# Patient Record
Sex: MALE | HISPANIC OR LATINO | URBAN - NONMETROPOLITAN AREA
[De-identification: names, ages, dates, MRNs, and addresses within clinical notes are randomized per-mention and may not be internally consistent; named-entity substitution may affect disease eponyms.]

---

## 2021-08-31 ENCOUNTER — OFFICE VISIT (OUTPATIENT)
Dept: FAMILY MEDICINE CLINIC | Facility: CLINIC | Age: 26
End: 2021-08-31

## 2021-08-31 VITALS
HEIGHT: 62 IN | OXYGEN SATURATION: 97 % | TEMPERATURE: 104.1 F | HEART RATE: 118 BPM | WEIGHT: 124 LBS | BODY MASS INDEX: 22.82 KG/M2 | RESPIRATION RATE: 20 BRPM | DIASTOLIC BLOOD PRESSURE: 58 MMHG | SYSTOLIC BLOOD PRESSURE: 110 MMHG

## 2021-08-31 DIAGNOSIS — Z20.822 COVID-19 RULED OUT BY LABORATORY TESTING: ICD-10-CM

## 2021-08-31 DIAGNOSIS — J06.9 UPPER RESPIRATORY TRACT INFECTION, UNSPECIFIED TYPE: ICD-10-CM

## 2021-08-31 DIAGNOSIS — Z20.822 COVID-19 RULED OUT BY LABORATORY TESTING: Primary | ICD-10-CM

## 2021-08-31 PROCEDURE — 99202 OFFICE O/P NEW SF 15 MIN: CPT | Performed by: NURSE PRACTITIONER

## 2021-08-31 PROCEDURE — 87636 SARSCOV2 & INF A&B AMP PRB: CPT | Performed by: NURSE PRACTITIONER

## 2021-08-31 PROCEDURE — 96372 THER/PROPH/DIAG INJ SC/IM: CPT | Performed by: NURSE PRACTITIONER

## 2021-08-31 RX ORDER — CEFTRIAXONE 500 MG/1
500 INJECTION, POWDER, FOR SOLUTION INTRAMUSCULAR; INTRAVENOUS ONCE
Status: COMPLETED | OUTPATIENT
Start: 2021-08-31 | End: 2021-08-31

## 2021-08-31 RX ORDER — DEXAMETHASONE SODIUM PHOSPHATE 4 MG/ML
4 INJECTION, SOLUTION INTRA-ARTICULAR; INTRALESIONAL; INTRAMUSCULAR; INTRAVENOUS; SOFT TISSUE ONCE
Status: COMPLETED | OUTPATIENT
Start: 2021-08-31 | End: 2021-08-31

## 2021-08-31 RX ADMIN — CEFTRIAXONE 500 MG: 500 INJECTION, POWDER, FOR SOLUTION INTRAMUSCULAR; INTRAVENOUS at 16:20

## 2021-08-31 RX ADMIN — DEXAMETHASONE SODIUM PHOSPHATE 4 MG: 4 INJECTION, SOLUTION INTRA-ARTICULAR; INTRALESIONAL; INTRAMUSCULAR; INTRAVENOUS; SOFT TISSUE at 16:21

## 2021-09-01 LAB
FLUAV RNA RESP QL NAA+PROBE: NOT DETECTED
FLUBV RNA RESP QL NAA+PROBE: NOT DETECTED
SARS-COV-2 RNA RESP QL NAA+PROBE: DETECTED

## 2021-09-01 NOTE — PATIENT INSTRUCTIONS
"Airborne Precautions  Airborne precautions are guidelines for the care of a person who has a disease that spreads through germs (particles) in the air. Examples of airborne diseases include measles, chickenpox, smallpox, tuberculosis, and possibly severe acute respiratory syndrome (SARS)-associated coronavirus. Following these guidelines helps keep the disease from spreading to others.  Guidelines for patients         If you have an airborne disease, follow these guidelines at the hospital or clinic:  · You will be treated in a private room with a special air supply.  ? Keep the door to your private room closed.  ? Wear a mask in your room as told by your nurse.  ? Check with your nurse before you leave your room.  · Wear a mask if you must go to another area of the hospital or clinic. Leave your room only when medically necessary. Make sure that the mask fits tightly.  · Cover your mouth with a tissue when you cough.  · Cover your mouth and nose with a tissue when you sneeze.  · If you have sores that can spread the infection (infectious lesions), keep the affected areas covered.  · Wash your hands often with soap and water for at least 20 seconds. This is an important way to prevent spread of the disease. If soap and water are not available, use an alcohol-based hand .  Guidelines for visitors  If you are visiting someone who has an airborne disease, follow these guidelines:  · Check with a nurse before you enter a room that has a sign that says \"Airborne Precautions.\"  · If you are allowed to enter the room, you will be asked to wash your hands and wear a mask over your nose and mouth. Make sure that the mask fits tightly.  · Do not take off your mask in the room.  · Do not eat or drink in the room.  · Do not use or touch any items in the room unless you ask a nurse first.  · Right after you leave the room:  1. Take off your mask. Throw it in the trash.  2. Wash your hands with soap and water for at " least 20 seconds. This is an important way to prevent spread of the disease. If soap and water are not available, use an alcohol-based hand .  Summary  · Airborne precautions are guidelines for the care of a person who has a disease that spreads through germs (particles) in the air.  · If you are a patient, keep the door to your room closed and wear a mask.  · If you are a visitor, check with the nurse before you enter the room, and wear a mask. Do not take it off while in the room.  · Washing your hands with soap and water for at least 20 seconds is an important way to prevent spread of disease. If soap and water are not available, use an alcohol-based hand .  This information is not intended to replace advice given to you by your health care provider. Make sure you discuss any questions you have with your health care provider.  Document Revised: 01/19/2021 Document Reviewed: 05/26/2020  [employer] Patient Education © 2021 [employer]  10 Things You Can Do to Manage Your COVID-19 Symptoms at Home  If you have possible or confirmed COVID-19:  1. Stay home from work and school. And stay away from other public places. If you must go out, avoid using any kind of public transportation, ridesharing, or taxis.  2. Monitor your symptoms carefully. If your symptoms get worse, call your healthcare provider immediately.  3. Get rest and stay hydrated.  4. If you have a medical appointment, call the healthcare provider ahead of time and tell them that you have or may have COVID-19.  5. For medical emergencies, call 911 and notify the dispatch personnel that you have or may have COVID-19.  6. Cover your cough and sneezes with a tissue or use the inside of your elbow.  7. Wash your hands often with soap and water for at least 20 seconds or clean your hands with an alcohol-based hand  that contains at least 60% alcohol.  8. As much as possible, stay in a specific room and away from other people in your  home. Also, you should use a separate bathroom, if available. If you need to be around other people in or outside of the home, wear a mask.  9. Avoid sharing personal items with other people in your household, like dishes, towels, and bedding.  10. Clean all surfaces that are touched often, like counters, tabletops, and doorknobs. Use household cleaning sprays or wipes according to the label instructions.  cdc.gov/coronavirus  07/01/2020  This information is not intended to replace advice given to you by your health care provider. Make sure you discuss any questions you have with your health care provider.  Document Revised: 04/15/2021 Document Reviewed: 04/15/2021  [employer] Patient Education © 2021 [employer]